# Patient Record
Sex: FEMALE | Employment: UNEMPLOYED | RURAL
[De-identification: names, ages, dates, MRNs, and addresses within clinical notes are randomized per-mention and may not be internally consistent; named-entity substitution may affect disease eponyms.]

---

## 2023-08-26 ENCOUNTER — HOSPITAL ENCOUNTER (EMERGENCY)
Facility: HOSPITAL | Age: 58
Discharge: LEFT BEFORE DISCHARGE | End: 2023-08-26
Attending: FAMILY MEDICINE

## 2023-08-26 VITALS
SYSTOLIC BLOOD PRESSURE: 139 MMHG | RESPIRATION RATE: 17 BRPM | HEIGHT: 68 IN | OXYGEN SATURATION: 99 % | HEART RATE: 85 BPM | WEIGHT: 167.31 LBS | TEMPERATURE: 98 F | DIASTOLIC BLOOD PRESSURE: 79 MMHG | BODY MASS INDEX: 25.36 KG/M2

## 2023-08-26 PROCEDURE — 99900041 HC LEFT WITHOUT BEING SEEN- EMERGENCY

## 2023-08-26 PROCEDURE — 99499 UNLISTED E&M SERVICE: CPT | Mod: ,,, | Performed by: FAMILY MEDICINE

## 2023-08-27 NOTE — ED PROVIDER NOTES
Encounter Date: 8/26/2023       History     Chief Complaint   Patient presents with    COVID-19 Post Vaccine Symptoms     HPI  Review of patient's allergies indicates:  No Known Allergies  No past medical history on file.  No past surgical history on file.  No family history on file.     Review of Systems    Physical Exam     Initial Vitals [08/26/23 1906]   BP Pulse Resp Temp SpO2   (!) 146/83 98 17 98 °F (36.7 °C) 98 %      MAP       --         Physical Exam    Medical Screening Exam   See Full Note    ED Course   Procedures  Labs Reviewed - No data to display       Imaging Results    None          Medications - No data to display  Medical Decision Making                             Clinical Impression:    Registered in error     ED Disposition Condition    Registered In Error - Other                 Dylon Carey MD  11/21/23 2805

## 2023-08-27 NOTE — ED TRIAGE NOTES
PATIENT PRESENTED TO ER AFTER TESTING POSITIVE FOR COVID ON THURSDAY X 2 TEST; IN ER FOR SOMETHING FOR COUGH/TEMP; PATIENT PRESENTED WITH NO TEMP; LAST TOOK TYLENOL THIS MORNING

## 2023-08-27 NOTE — ED TRIAGE NOTES
Pt to er with c/o covid concerns- pt took home test on Thursday - states it was positive- pt states she last took tylenol at 0900 this am - pt wanting to know if she can get treatment for her covid symptoms cough, fever, and aches